# Patient Record
Sex: MALE | Race: WHITE
[De-identification: names, ages, dates, MRNs, and addresses within clinical notes are randomized per-mention and may not be internally consistent; named-entity substitution may affect disease eponyms.]

---

## 2022-02-25 ENCOUNTER — HOSPITAL ENCOUNTER (EMERGENCY)
Dept: HOSPITAL 46 - ED | Age: 52
Discharge: HOME | End: 2022-02-25
Payer: MEDICARE

## 2022-02-25 VITALS — WEIGHT: 230.01 LBS | HEIGHT: 72 IN | BODY MASS INDEX: 31.15 KG/M2

## 2022-02-25 DIAGNOSIS — Z88.8: ICD-10-CM

## 2022-02-25 DIAGNOSIS — Z79.899: ICD-10-CM

## 2022-02-25 DIAGNOSIS — K08.89: Primary | ICD-10-CM

## 2022-02-25 DIAGNOSIS — E11.40: ICD-10-CM

## 2022-02-25 DIAGNOSIS — Z88.0: ICD-10-CM

## 2022-02-25 DIAGNOSIS — Z88.5: ICD-10-CM

## 2022-02-25 PROCEDURE — A9270 NON-COVERED ITEM OR SERVICE: HCPCS

## 2022-02-27 ENCOUNTER — HOSPITAL ENCOUNTER (EMERGENCY)
Dept: HOSPITAL 46 - ED | Age: 52
Discharge: HOME | End: 2022-02-27
Payer: MEDICARE

## 2022-02-27 VITALS — HEIGHT: 72 IN | BODY MASS INDEX: 31.15 KG/M2 | WEIGHT: 230.01 LBS

## 2022-02-27 DIAGNOSIS — E11.9: ICD-10-CM

## 2022-02-27 DIAGNOSIS — Z88.5: ICD-10-CM

## 2022-02-27 DIAGNOSIS — Z88.0: ICD-10-CM

## 2022-02-27 DIAGNOSIS — Z79.899: ICD-10-CM

## 2022-02-27 DIAGNOSIS — Z94.0: ICD-10-CM

## 2022-02-27 DIAGNOSIS — K08.89: ICD-10-CM

## 2022-02-27 DIAGNOSIS — R10.9: ICD-10-CM

## 2022-02-27 DIAGNOSIS — K29.00: Primary | ICD-10-CM

## 2022-02-27 DIAGNOSIS — Z88.8: ICD-10-CM

## 2023-01-09 ENCOUNTER — HOSPITAL ENCOUNTER (EMERGENCY)
Dept: HOSPITAL 46 - ED | Age: 53
Discharge: HOME | End: 2023-01-09
Payer: MEDICARE

## 2023-01-09 VITALS — HEIGHT: 72 IN | WEIGHT: 231.49 LBS | BODY MASS INDEX: 31.35 KG/M2

## 2023-01-09 DIAGNOSIS — Z88.8: ICD-10-CM

## 2023-01-09 DIAGNOSIS — S01.81XA: ICD-10-CM

## 2023-01-09 DIAGNOSIS — Z79.899: ICD-10-CM

## 2023-01-09 DIAGNOSIS — Z79.01: ICD-10-CM

## 2023-01-09 DIAGNOSIS — Z94.0: ICD-10-CM

## 2023-01-09 DIAGNOSIS — E11.51: ICD-10-CM

## 2023-01-09 DIAGNOSIS — N28.9: ICD-10-CM

## 2023-01-09 DIAGNOSIS — Z88.5: ICD-10-CM
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
## 2023-01-09 DIAGNOSIS — Z88.0: ICD-10-CM

## 2023-01-09 DIAGNOSIS — E11.649: Primary | ICD-10-CM

## 2023-01-09 DIAGNOSIS — E11.42: ICD-10-CM

## 2023-01-09 NOTE — XMS
PreManage Notification: NICOLE BARCLAY MRN:[id number]
 
Security Information
 
Security Events
No recent Security Events currently on file
 
 
 
CRITERIA MET
------------
- PDMP
 
 
CARE PROVIDERS
-------------------------------------------------------------------------------------
William Adams     /Care Coordinator     12/01/2022-Current
 
PHONE: [phone]
-------------------------------------------------------------------------------------
 
John has no Care Guidelines for this patient.
 
EJEROMY VISIT COUNT (12 MO.)
-------------------------------------------------------------------------------------
3 [facility]
-------------------------------------------------------------------------------------
TOTAL 3
-------------------------------------------------------------------------------------
NOTE: Visits indicate total known visits.
 
ED/UCC VISIT TRACKING (12 MO.)
-------------------------------------------------------------------------------------
01/09/2023 19:29
[facility] [place]
 
TYPE: Emergency
 
COMPLAINT:
- BLOOD SUGAR PROBLEM
-------------------------------------------------------------------------------------
02/27/2022 06:07
[facility] [place]
 
TYPE: Emergency
 
COMPLAINT:
- DIABETIC PROBLEM
 
DIAGNOSES:
- Other specified disorders of teeth and supporting structures
- Allergy status to narcotic agent
- Type 2 diabetes mellitus without complications
- Kidney transplant status
- Nausea with vomiting, unspecified
- Unspecified abdominal pain
- Acute gastritis without bleeding
- Allergy status to penicillin
- Allergy status to other drugs, medicaments and biological substances
 
- Other long term (current) drug therapy
-------------------------------------------------------------------------------------
02/25/2022 11:05
[facility] [place]
 
TYPE: Emergency
 
COMPLAINT:
- DENTAL ISSUE
 
DIAGNOSES:
- Allergy status to narcotic agent
- Allergy status to penicillin
- Other specified disorders of teeth and supporting structures
- Other long term (current) drug therapy
- Type 2 diabetes mellitus with diabetic neuropathy, unspecified
- Allergy status to other drugs, medicaments and biological substances
-------------------------------------------------------------------------------------
 
 
INPATIENT VISIT TRACKING (12 MO.)
No inpatient visits to display in this time frame
 
[email or web address]
all other ROS negative except as per HPI